# Patient Record
(demographics unavailable — no encounter records)

---

## 2025-07-09 NOTE — HISTORY OF PRESENT ILLNESS
[FreeTextEntry1] : 33 yo  presents for ongoing prenatal care at 30 weeks 1 day gestation. She reports that her prenatal care was uncomplicated thus far. She had a sequential integrated AFP test which showed an increased risk of down syndrome, but her NIPS test was normal. She was seen by genetic counseling at that time and no f/u was recommended. No significant past gyn hx. C/o vaginal discharge with odor.  OB hx:  2016: Spontaneous AB 2020: C/S 40 weeks for arrest of dilation at 9cm M 8lbs 15 oz complicated by PP hemorrhage

## 2025-07-09 NOTE — PLAN
[FreeTextEntry1] : BV and GC cultures performed  Prenatal care and talk.  vs repeat C/S discussed w/ pt. She is contemplating TOLAC and will like C/S scheduled  (40 weeks 2d) if does not labor on her own prior to that. Sent for growth scan

## 2025-07-09 NOTE — END OF VISIT
[TextEntry] : IGeorgie, am scribing for and the presence of Dr. Rere Ram the following sections HISTORY OF PRESENT ILLNESS, PAST MEDICAL/FAMILY/SOCIAL HISTORY; REVIEW OF SYSTEMS; PHYSICAL EXAM; DISPOSITION.

## 2025-07-09 NOTE — HISTORY OF PRESENT ILLNESS
[FreeTextEntry1] : 35 yo  presents for ongoing prenatal care at 30 weeks 1 day gestation. She reports that her prenatal care was uncomplicated thus far. She had a sequential integrated AFP test which showed an increased risk of down syndrome, but her NIPS test was normal. She was seen by genetic counseling at that time and no f/u was recommended. No significant past gyn hx. C/o vaginal discharge with odor.  OB hx:  2016: Spontaneous AB 2020: C/S 40 weeks for arrest of dilation at 9cm M 8lbs 15 oz complicated by PP hemorrhage

## 2025-07-09 NOTE — PHYSICAL EXAM
[TextEntry] : Constitutional: Awake, alert, and no acute distress  Breast: No breast mass, no tenderness, no nipple discharge and no axillary lymphadenopathy Abdomen: Soft and nontender Neurologic: Oriented to person, time and place Genitourinary: No abnormalities of the external genitalia, cervix Vagina: white discharge